# Patient Record
Sex: MALE | Race: WHITE | Employment: OTHER
[De-identification: names, ages, dates, MRNs, and addresses within clinical notes are randomized per-mention and may not be internally consistent; named-entity substitution may affect disease eponyms.]

---

## 2023-09-13 ENCOUNTER — OFFICE VISIT (OUTPATIENT)
Dept: FAMILY MEDICINE CLINIC | Facility: CLINIC | Age: 28
End: 2023-09-13
Payer: COMMERCIAL

## 2023-09-13 VITALS
WEIGHT: 267.2 LBS | DIASTOLIC BLOOD PRESSURE: 70 MMHG | BODY MASS INDEX: 33.22 KG/M2 | HEART RATE: 68 BPM | OXYGEN SATURATION: 97 % | HEIGHT: 75 IN | SYSTOLIC BLOOD PRESSURE: 110 MMHG | TEMPERATURE: 97.9 F

## 2023-09-13 DIAGNOSIS — Z00.00 ROUTINE GENERAL MEDICAL EXAMINATION AT A HEALTH CARE FACILITY: Primary | ICD-10-CM

## 2023-09-13 LAB
CHOLEST SERPL-MCNC: 158 MG/DL
HDLC SERPL-MCNC: 38 MG/DL (ref 40–60)
HDLC SERPL: 4.2
LDLC SERPL CALC-MCNC: 106.2 MG/DL
TRIGL SERPL-MCNC: 69 MG/DL (ref 35–150)
VLDLC SERPL CALC-MCNC: 13.8 MG/DL (ref 6–23)

## 2023-09-13 PROCEDURE — 99395 PREV VISIT EST AGE 18-39: CPT | Performed by: NURSE PRACTITIONER

## 2023-09-13 RX ORDER — ASPIRIN 81 MG/1
81 TABLET ORAL DAILY PRN
COMMUNITY

## 2023-09-13 SDOH — ECONOMIC STABILITY: HOUSING INSECURITY
IN THE LAST 12 MONTHS, WAS THERE A TIME WHEN YOU DID NOT HAVE A STEADY PLACE TO SLEEP OR SLEPT IN A SHELTER (INCLUDING NOW)?: NO

## 2023-09-13 SDOH — ECONOMIC STABILITY: FOOD INSECURITY: WITHIN THE PAST 12 MONTHS, YOU WORRIED THAT YOUR FOOD WOULD RUN OUT BEFORE YOU GOT MONEY TO BUY MORE.: NEVER TRUE

## 2023-09-13 SDOH — ECONOMIC STABILITY: FOOD INSECURITY: WITHIN THE PAST 12 MONTHS, THE FOOD YOU BOUGHT JUST DIDN'T LAST AND YOU DIDN'T HAVE MONEY TO GET MORE.: NEVER TRUE

## 2023-09-13 SDOH — ECONOMIC STABILITY: INCOME INSECURITY: HOW HARD IS IT FOR YOU TO PAY FOR THE VERY BASICS LIKE FOOD, HOUSING, MEDICAL CARE, AND HEATING?: NOT HARD AT ALL

## 2023-09-13 ASSESSMENT — PATIENT HEALTH QUESTIONNAIRE - PHQ9
SUM OF ALL RESPONSES TO PHQ9 QUESTIONS 1 & 2: 0
SUM OF ALL RESPONSES TO PHQ QUESTIONS 1-9: 0
1. LITTLE INTEREST OR PLEASURE IN DOING THINGS: 0
2. FEELING DOWN, DEPRESSED OR HOPELESS: 0
SUM OF ALL RESPONSES TO PHQ QUESTIONS 1-9: 0

## 2023-09-13 ASSESSMENT — ENCOUNTER SYMPTOMS
COLOR CHANGE: 0
ABDOMINAL PAIN: 0
COUGH: 0
SHORTNESS OF BREATH: 0
ABDOMINAL DISTENTION: 0
EYES NEGATIVE: 1
BLOOD IN STOOL: 0
WHEEZING: 0
CONSTIPATION: 0
BACK PAIN: 0
CHEST TIGHTNESS: 0
NAUSEA: 0
VOMITING: 0
DIARRHEA: 0

## 2023-09-13 NOTE — PROGRESS NOTES
[address], [address] [place] [address] Road  Phone [phone]  Fax:  [phone]    Patient: Benito Denver  YOB: 1995  Patient Age 25 y.o. Patient sex: male  Medical Record:  [id number]  Visit Date: 09/13/23    [facility] Note     No chief complaint on file. History of Present Illness:  Mr. Dano Goel is a pleasant 25year old male with no PMH who presents for ED follow up for chest pain. Patient was seen at [facility] ED. According to notes, patient experienced left side CP with shortness of breath while cooking dinner. Unremarkable workup. Allergies:Not on File    Current Medications:   Medications marked \"taking\" at this time:    Current Outpatient Medications:     fluticasone (FLONASE) 50 MCG/ACT nasal spray, SPRAY 2 SPRAYS INTO EACH NOSTRIL TWICE A DAY, Disp: , Rfl:     methylPREDNISolone (MEDROL DOSEPACK) 4 MG tablet, Per dose pack instructions for 5 days, Disp: , Rfl:     Current Problem List: There is no problem list on file for this patient. Social History:   Social History     Tobacco Use    Smoking status: Never    Smokeless tobacco: Never   Substance Use Topics    Alcohol use: Yes     Alcohol/week: 3.0 standard drinks of alcohol       Family History:   Family History   Problem Relation Age of Onset    Diabetes Paternal Grandmother     No Known Problems Brother     No Known Problems Mother     Hypertension Father     No Known Problems Brother     Diabetes Maternal Grandmother     Heart Attack Maternal Grandfather        Surgical History:  Past Surgical History:   Procedure Laterality Date    HEENT      tubes for ears    WISDOM TOOTH EXTRACTION         ROS  Review of Systems    There were no vitals taken for this visit. Physical Exam  Physical Exam       ASSESSMENT & PLAN      I have reviewed the patient's past medical history, social history and family history and vitals.   We have discussed treatment plan and follow up and given

## 2023-09-13 NOTE — PROGRESS NOTES
Chief Complaint   Patient presents with    Follow-Up from Hospital     Patient is here post hospital follow up. He states his initial reason for going to [facility] ER was head/chest pain and elevated BP. HISTORY OF PRESENT ILLNESS:  Clif Lord is a very pleasant 25 y.o. male who presents for a general physical. He reports a normal energy level, appetite, and sleep habits. He does not exercise regularly. He does not use tobacco and seldom has ETOH since having children. His PMH is significant for: Seasonal allergies, takes OTC med as needed. Patient was seen at [facility] ED x 2 days ago for chest pain and shortness of breath. According to notes, patient experienced left side CP with shortness of breath while cooking dinner. Patient states symptoms began with a headache then felt the back of his neck get hot, felt tightness in his chest.  States his BP was elevated in the 150/160. Unremarkable workup while in the ED. Denies any other episodes past or present. Thinks it was more than stress related as he is in the process of moving and changing jobs. Family history of HTN, DM, heart attack with maternal grandfather. ED notes and labs reviewed. HEALTH MAINTENANCE:   Colon Cancer Screening- NA  Prostate Cancer Screening- NA  Influenza Vaccine- No   COVID-19 vaccine- Yes  Shingles Vaccine- NA  Pneumonia Vaccine- NA  Annual Eye Exams- No   Annual Dental Exams- Yes      PAST MEDICAL HISTORY  Current Outpatient Medications   Medication Sig    aspirin 81 MG EC tablet Take 1 tablet by mouth daily as needed for Pain    fluticasone (FLONASE) 50 MCG/ACT nasal spray SPRAY 2 SPRAYS INTO EACH NOSTRIL TWICE A DAY (Patient not taking: Reported on 9/13/2023)    methylPREDNISolone (MEDROL DOSEPACK) 4 MG tablet Per dose pack instructions for 5 days (Patient not taking: Reported on 9/13/2023)     No current facility-administered medications for this visit. No Known Allergies   History reviewed.  No